# Patient Record
Sex: FEMALE | Race: BLACK OR AFRICAN AMERICAN | URBAN - METROPOLITAN AREA
[De-identification: names, ages, dates, MRNs, and addresses within clinical notes are randomized per-mention and may not be internally consistent; named-entity substitution may affect disease eponyms.]

---

## 2017-09-21 ENCOUNTER — OFFICE VISIT (OUTPATIENT)
Dept: FAMILY MEDICINE CLINIC | Age: 57
End: 2017-09-21

## 2017-09-21 VITALS
HEIGHT: 64 IN | BODY MASS INDEX: 30.9 KG/M2 | WEIGHT: 181 LBS | OXYGEN SATURATION: 99 % | HEART RATE: 68 BPM | TEMPERATURE: 98.2 F | SYSTOLIC BLOOD PRESSURE: 162 MMHG | DIASTOLIC BLOOD PRESSURE: 109 MMHG | RESPIRATION RATE: 16 BRPM

## 2017-09-21 DIAGNOSIS — J45.20 MILD INTERMITTENT ASTHMA WITHOUT COMPLICATION: ICD-10-CM

## 2017-09-21 DIAGNOSIS — E03.9 ACQUIRED HYPOTHYROIDISM: Primary | ICD-10-CM

## 2017-09-21 DIAGNOSIS — I10 ESSENTIAL HYPERTENSION: ICD-10-CM

## 2017-09-21 DIAGNOSIS — Z91.09 ENVIRONMENTAL ALLERGIES: ICD-10-CM

## 2017-09-21 RX ORDER — LEVOTHYROXINE SODIUM 137 UG/1
137 TABLET ORAL
Qty: 30 TAB | Refills: 5 | Status: SHIPPED | OUTPATIENT
Start: 2017-09-21 | End: 2018-10-16 | Stop reason: SDUPTHER

## 2017-09-21 RX ORDER — LISINOPRIL AND HYDROCHLOROTHIAZIDE 12.5; 2 MG/1; MG/1
1 TABLET ORAL DAILY
Qty: 30 TAB | Refills: 2 | Status: SHIPPED | OUTPATIENT
Start: 2017-09-21 | End: 2018-10-16 | Stop reason: SDUPTHER

## 2017-09-21 RX ORDER — ALBUTEROL SULFATE 90 UG/1
2 AEROSOL, METERED RESPIRATORY (INHALATION)
Qty: 1 INHALER | Refills: 0 | Status: SHIPPED | COMMUNITY
Start: 2017-09-21 | End: 2017-10-02 | Stop reason: SDUPTHER

## 2017-09-21 RX ORDER — LORATADINE 10 MG/1
10 TABLET ORAL DAILY
Qty: 30 TAB | Refills: 5 | Status: SHIPPED | OUTPATIENT
Start: 2017-09-21 | End: 2018-10-18 | Stop reason: SDUPTHER

## 2017-09-21 NOTE — PROGRESS NOTES
You may qualify for a Free Mammogram and/or Pap Smear from [facility]. Please call [phone] to start the screening process so you can be scheduled for these important services. The process cannot begin until you make the call to begin screening.

## 2017-09-21 NOTE — PROGRESS NOTES
Discharge instructions reviewed with patient    Medication list and understanding of medications reviewed with patient. OTC and herbal medications reviewed and added to med list if applicable  Barriers to adherence assessed. Guidance given regarding new medications this visit, including reason for taking this medicine, and common side effects. Thank you for returning your application for medication assistance. We will fax your application to the [facility] prescription , Juan C Amos. It may take anywhere from 3 to 6 weeks for your application to be approved. THE FIRST FILL OF YOUR MEDICINE WILL BE DELIVERED TO THE [facility] AUTOMATICALLY. CALL [phone] OR [phone]   TO ASK FOR REFILLS WHEN YOU HAVE ONE MONTH   OF MEDICINE LEFT. Think of it the same way as when you call your regular pharmacy to order your medicine refills. AVS given to pt.

## 2017-09-21 NOTE — PROGRESS NOTES
HPI  Dora Haines is a 62 y.o. female being seen today for   Chief Complaint   Patient presents with    Medication Refill   . IOV for this pt to care a Pearl becerra.  she states that she lost insurance needs refill on her bp, asthma medicines and thyroid medications. Out of inhalers and wheezing in her sleep. Past Medical History:   Diagnosis Date    Asthma     Hypertension     Thyroid disease          ROS  Patient states that she is feeling well. Denies complaints of chest pain, shortness of breath, swelling of legs, dizziness or weakness. she denies nausea, vomiting or diarrhea. Current Outpatient Prescriptions   Medication Sig    albuterol (PROVENTIL HFA, VENTOLIN HFA, PROAIR HFA) 90 mcg/actuation inhaler Take 2 Puffs by inhalation every four (4) hours as needed for Wheezing.  lisinopril-hydroCHLOROthiazide (PRINZIDE, ZESTORETIC) 20-12.5 mg per tablet Take 1 Tab by mouth daily.  loratadine (CLARITIN) 10 mg tablet Take 1 Tab by mouth daily.  levothyroxine (SYNTHROID) 137 mcg tablet Take 137 mcg by mouth Daily (before breakfast). No current facility-administered medications for this visit. PE  Visit Vitals    BP (!) 162/109 (BP 1 Location: Left arm, BP Patient Position: Sitting)    Pulse 68    Temp 98.2 °F (36.8 °C) (Oral)    Resp 16    Ht 5' 4\" (1.626 m)    Wt 181 lb (82.1 kg)    SpO2 99%    BMI 31.07 kg/m2        Alert and oriented with normal mood and affect. she is well developed and well nourished . Lungs are clear without wheezing. Heart rate is regular without murmurs or gallops. There is no lower extremity edema. No results found for this or any previous visit. Assessment and Plan:        ICD-10-CM ICD-9-CM    1. Acquired hypothyroidism E03.9 244.9    2. Essential hypertension I10 401.9    3. Mild intermittent asthma without complication H47.01 707.79    4.  Environmental allergies Z91.09 V15.09      Sample albuterol and initiate pap rx process for same  Refill all other meds as previous. Asked her to cut her thyroid med in half for first week, then increase to whole pill if she feels good.       Erick Travis MD

## 2017-09-21 NOTE — MR AVS SNAPSHOT
Visit Information Date & Time Provider Department Dept. Phone Encounter #  
 9/21/2017 10:30 AM Sigifredo Woo, [address] [id number] Upcoming Health Maintenance Date Due Hepatitis C Screening 1960 DTaP/Tdap/Td series (1 - Tdap) 3/17/1981 PAP AKA CERVICAL CYTOLOGY 3/17/1981 BREAST CANCER SCRN MAMMOGRAM 3/17/2010 FOBT Q 1 YEAR AGE 50-75 3/17/2010 INFLUENZA AGE 9 TO ADULT 8/1/2017 Allergies as of 9/21/2017  Review Complete On: 9/21/2017 By: Lorenzo Tapia No Known Allergies Current Immunizations  Never Reviewed No immunizations on file. Not reviewed this visit You Were Diagnosed With   
  
 Codes Comments Acquired hypothyroidism    -  Primary ICD-10-CM: E03.9 ICD-9-CM: 244.9 Essential hypertension     ICD-10-CM: I10 
ICD-9-CM: 401.9 Mild intermittent asthma without complication     HEENA-67-AR: J45.20 ICD-9-CM: 493.90 Environmental allergies     ICD-10-CM: Z91.09 
ICD-9-CM: V15.09 Vitals BP Pulse Temp Resp Height(growth percentile) Weight(growth percentile) (!) 162/109 (BP 1 Location: Left arm, BP Patient Position: Sitting) 68 98.2 °F (36.8 °C) (Oral) 16 5' 4\" (1.626 m) 181 lb (82.1 kg) SpO2 BMI OB Status Smoking Status 99% 31.07 kg/m2 Menopause Current Every Day Smoker Vitals History BMI and BSA Data Body Mass Index Body Surface Area 31.07 kg/m 2 1.93 m 2 Preferred Pharmacy Pharmacy Name Phone [address] [phone] Your Updated Medication List  
  
   
This list is accurate as of: 9/21/17 11:41 AM.  Always use your most recent med list.  
  
  
  
  
 albuterol 90 mcg/actuation inhaler Commonly known as:  PROVENTIL HFA, VENTOLIN HFA, PROAIR HFA Take 2 Puffs by inhalation every four (4) hours as needed for Wheezing. levothyroxine 137 mcg tablet Commonly known as:  SYNTHROID Take 137 mcg by mouth Daily (before breakfast). lisinopril-hydroCHLOROthiazide 20-12.5 mg per tablet Commonly known as:  Venu Freddy Take 1 Tab by mouth daily. loratadine 10 mg tablet Commonly known as:  [place] Pain Take 1 Tab by mouth daily. Prescriptions Sent to Pharmacy Refills  
 lisinopril-hydroCHLOROthiazide (PRINZIDE, ZESTORETIC) 20-12.5 mg per tablet 2 Sig: Take 1 Tab by mouth daily. Class: Normal  
 Pharmacy: [address] [place] [address] Ph #: [phone] Route: Oral  
 loratadine (CLARITIN) 10 mg tablet 5 Sig: Take 1 Tab by mouth daily. Class: Normal  
 Pharmacy: [address] [place] [address] Ph #: [phone] Route: Oral  
 levothyroxine (SYNTHROID) 137 mcg tablet 5 Sig: Take 137 mcg by mouth Daily (before breakfast). Class: Normal  
 Pharmacy: [address] [place] [address] Ph #: [phone] Route: Oral  
  
Introducing [facility] SERVICES! [employer] introduces [employer] patient portal. Now you can access parts of your medical record, email your doctor's office, and request medication refills online. 1. In your internet browser, go to [email or web address] 2. Click on the First Time User? Click Here link in the Sign In box. You will see the New Member Sign Up page. 3. Enter your [employer] Access Code exactly as it appears below. You will not need to use this code after youve completed the sign-up process. If you do not sign up before the expiration date, you must request a new code. · [employer] Access Code: [email or web address] Expires: 12/20/2017 11:09 AM 
 
4. Enter the last four digits of your Social Security Number (xxxx) and Date of Birth (mm/dd/yyyy) as indicated and click Submit. You will be taken to the next sign-up page. 5. Create a [employer] ID. This will be your [employer] login ID and cannot be changed, so think of one that is secure and easy to remember. 6. Create a [employer] password. You can change your password at any time. 7. Enter your Password Reset Question and Answer. This can be used at a later time if you forget your password. 8. Enter your e-mail address. You will receive e-mail notification when new information is available in [address] 9. Click Sign Up. You can now view and download portions of your medical record. 10. Click the Download Summary menu link to download a portable copy of your medical information. If you have questions, please visit the Frequently Asked Questions section of the [employer] website. Remember, [employer] is NOT to be used for urgent needs. For medical emergencies, dial 911. Now available from your iPhone and Android! Please provide this summary of care documentation to your next provider. If you have any questions after today's visit, please call [phone].

## 2017-09-25 ENCOUNTER — DOCUMENTATION ONLY (OUTPATIENT)
Dept: FAMILY MEDICINE CLINIC | Age: 57
End: 2017-09-25

## 2017-09-25 NOTE — PROGRESS NOTES
Received Pharmacy Assistance Program Application for Ventolin HFA completed from patient. Application faxed to Osvaldo Gardner for review. Fax confirmation received.

## 2017-10-02 RX ORDER — ALBUTEROL SULFATE 90 UG/1
2 AEROSOL, METERED RESPIRATORY (INHALATION)
Qty: 3 INHALER | Refills: 3 | Status: SHIPPED | COMMUNITY
Start: 2017-10-02

## 2017-10-02 NOTE — TELEPHONE ENCOUNTER
Received incoming fax from Selin Mcbride 182 list and chart notes reviewed. Pharmacy Assistance Medication approved for direct mail to anthony Douglas 90mcg/act   2 puff q 4 hour prn wheezing

## 2018-10-18 RX ORDER — LISINOPRIL AND HYDROCHLOROTHIAZIDE 12.5; 2 MG/1; MG/1
TABLET ORAL
Qty: 30 TAB | Refills: 1 | Status: SHIPPED | OUTPATIENT
Start: 2018-10-18

## 2018-10-18 RX ORDER — LEVOTHYROXINE SODIUM 137 UG/1
TABLET ORAL
Qty: 30 TAB | Refills: 1 | Status: SHIPPED | OUTPATIENT
Start: 2018-10-18 | End: 2019-04-25 | Stop reason: SDUPTHER

## 2018-10-18 RX ORDER — LORATADINE 10 MG/1
TABLET ORAL
Qty: 30 TAB | Refills: 1 | Status: SHIPPED | OUTPATIENT
Start: 2018-10-18

## 2019-04-25 RX ORDER — LEVOTHYROXINE SODIUM 137 UG/1
TABLET ORAL
Qty: 30 TAB | Refills: 0 | Status: SHIPPED | OUTPATIENT
Start: 2019-04-25

## 2019-05-24 RX ORDER — LEVOTHYROXINE SODIUM 137 UG/1
TABLET ORAL
Qty: 30 TAB | Refills: 0 | OUTPATIENT
Start: 2019-05-24

## 2019-06-29 RX ORDER — LEVOTHYROXINE SODIUM 137 UG/1
TABLET ORAL
Qty: 30 TAB | Refills: 0 | OUTPATIENT
Start: 2019-06-29

## 2019-10-10 ENCOUNTER — OFFICE VISIT (OUTPATIENT)
Dept: ORTHOPEDIC SURGERY | Facility: CLINIC | Age: 59
End: 2019-10-10

## 2019-10-10 VITALS
WEIGHT: 236.6 LBS | HEART RATE: 72 BPM | SYSTOLIC BLOOD PRESSURE: 151 MMHG | BODY MASS INDEX: 40.39 KG/M2 | HEIGHT: 64 IN | TEMPERATURE: 98.8 F | OXYGEN SATURATION: 100 % | DIASTOLIC BLOOD PRESSURE: 70 MMHG | RESPIRATION RATE: 18 BRPM

## 2019-10-10 DIAGNOSIS — M22.41 CHONDROMALACIA OF BOTH PATELLAE: ICD-10-CM

## 2019-10-10 DIAGNOSIS — G89.29 CHRONIC PAIN OF LEFT KNEE: ICD-10-CM

## 2019-10-10 DIAGNOSIS — M25.562 CHRONIC PAIN OF LEFT KNEE: ICD-10-CM

## 2019-10-10 DIAGNOSIS — M17.0 LOCALIZED OSTEOARTHRITIS OF BOTH KNEES: Primary | ICD-10-CM

## 2019-10-10 DIAGNOSIS — M25.561 CHRONIC PAIN OF RIGHT KNEE: ICD-10-CM

## 2019-10-10 DIAGNOSIS — M22.42 CHONDROMALACIA OF BOTH PATELLAE: ICD-10-CM

## 2019-10-10 DIAGNOSIS — G89.29 CHRONIC PAIN OF RIGHT KNEE: ICD-10-CM

## 2019-10-10 DIAGNOSIS — E66.01 OBESITY, MORBID (HCC): ICD-10-CM

## 2019-10-10 RX ORDER — CLONIDINE 0.2MG/24HR
PATCH, TRANSDERMAL WEEKLY TRANSDERMAL
COMMUNITY
Start: 2019-09-16

## 2019-10-10 RX ORDER — GABAPENTIN 400 MG/1
CAPSULE ORAL
COMMUNITY
Start: 2019-09-27

## 2019-10-10 RX ORDER — METFORMIN HYDROCHLORIDE 850 MG/1
TABLET ORAL
COMMUNITY
Start: 2019-09-16

## 2019-10-10 RX ORDER — GUAIFENESIN 100 MG/5ML
LIQUID (ML) ORAL
COMMUNITY
Start: 2019-09-16

## 2019-10-10 RX ORDER — ATORVASTATIN CALCIUM 20 MG/1
TABLET, FILM COATED ORAL
COMMUNITY
Start: 2019-09-16

## 2019-10-10 RX ORDER — SERTRALINE HYDROCHLORIDE 25 MG/1
TABLET, FILM COATED ORAL
COMMUNITY
Start: 2019-09-16

## 2019-10-10 RX ORDER — OLANZAPINE 5 MG/1
TABLET, ORALLY DISINTEGRATING ORAL
COMMUNITY
Start: 2019-09-16

## 2019-10-10 RX ORDER — BETAMETHASONE SODIUM PHOSPHATE AND BETAMETHASONE ACETATE 3; 3 MG/ML; MG/ML
6 INJECTION, SUSPENSION INTRA-ARTICULAR; INTRALESIONAL; INTRAMUSCULAR; SOFT TISSUE ONCE
Qty: 0.5 ML | Refills: 0
Start: 2019-10-10 | End: 2019-10-10

## 2019-10-10 NOTE — LETTER
10/10/2019 10:29 AM 
 
Ms. Argelia Pérez [address] [phone] Full work restrictions for injuries to the Thigh, Knee, Leg, Ankle, and Foot PATIENT'S NAME: Cathy Kiser   DATE: 10/10/2019 LIFTING:   The patient can lift no more than 20 pounds. CLIMBING:   The patient can climb no ladders or stairs WALKING/STANDING The patient can walk or stand no more than 1 hour at a time. The patient cannot walk on uneven ground or on scaffolding. KNEELING/SQUATTING The patient cannot kneel or squat These restrictions are in effect for the above named patient From: 10/10/2019  TO: PERMANENT Sincerely, 
 
 
 
Sandra Garcia MD

## 2019-10-10 NOTE — PROGRESS NOTES
Verbal order given by Dr. Aly Albright to draw up 4 mL 0.25% Sensorcaine and 0.5 mL 30 mg/5mL Betamethasone X 2.

## 2019-10-10 NOTE — PROGRESS NOTES
Patient: Everardo Kenny                MRN: [id number]       SSN: [id number]  YOB: 1960        AGE: 61 y.o. SEX: female    PCP: No primary care provider on file. 10/10/19    Chief Complaint   Patient presents with    Knee Pain     Abelino     HISTORY:  Everardo Kenny is a 61 y.o. female who is seen for bilateral knee pain. She has been experiencing knee pain for the past several years. She does not recall any injury. She notes pain with standing, walking, and stair climbing. She experiences startup pain after sitting. She is s/p left medial menisectomy by Dr. Naz Nelson in 2006. She states there was no injury to her left knee prior to her knee surgery. She stopped seeing Dr. Naz Nelson for insurance reasons. She now presents for a second orthopedic opinion and further care. Pain Assessment  10/10/2019   Location of Pain Knee   Location Modifiers Left;Right   Severity of Pain 5   Quality of Pain Aching; Sharp   Duration of Pain Persistent   Frequency of Pain Constant   Aggravating Factors Walking;Standing;Bending   Limiting Behavior Yes   Relieving Factors Heat   Result of Injury No     Occupation, etc:  Ms. Martín Zamora is not currently employed. She is applying for social security disability benefits for knee pain. She previously worked as a  at the [employer] and as a  at [employer]. She lives in [place] with her 24 yo daughter. Her daughter helps her with activities around the house. She also has a 41 yo son, a 10 yo grandson and 7 yo grandson. She is present with her counselor today. She enjoys chair dancing. Ms. Martín Zamora weighs 181 lbs and is 5'4\" tall.        No results found for: HBA1C, HGBE8, UUR0ICYQ, WWL3WLKH, QDK6DBBU  Weight Metrics 10/10/2019 9/21/2017   Weight 236 lb 9.6 oz 181 lb   BMI 40.61 kg/m2 31.07 kg/m2       Patient Active Problem List   Diagnosis Code    Essential hypertension I10    Acquired hypothyroidism E03.9    Mild intermittent asthma without complication K32.96    Environmental allergies Z91.09     REVIEW OF SYSTEMS: All Below are Negative except: See HPI   Constitutional: negative for fever, chills, and weight loss. Cardiovascular: negative for chest pain, claudication, leg swelling, SOB, ZULUAGA   Gastrointestinal: Negative for pain, N/V/C/D, Blood in stool or urine, dysuria, hematuria, incontinence, pelvic pain. Musculoskeletal: See HPI   Neurological: Negative for dizziness and weakness. Negative for headaches, Visual changes, confusion, seizures   Phychiatric/Behavioral: Negative for depression, memory loss, substance abuse. Extremities: Negative for hair changes, rash, or skin lesion changes. Hematologic: Negative for bleeding problems, bruising, pallor or swollen lymph nodes   Peripheral Vascular: No calf pain, no circulation deficits.     Social History     Socioeconomic History    Marital status: SINGLE     Spouse name: Not on file    Number of children: Not on file    Years of education: Not on file    Highest education level: Not on file   Occupational History    Not on file   Social Needs    Financial resource strain: Not on file    Food insecurity:     Worry: Not on file     Inability: Not on file    Transportation needs:     Medical: Not on file     Non-medical: Not on file   Tobacco Use    Smoking status: Current Every Day Smoker     Packs/day: 0.50     Types: Cigarettes    Smokeless tobacco: Never Used   Substance and Sexual Activity    Alcohol use: Yes     Comment: occ    Drug use: Not on file    Sexual activity: Not on file   Lifestyle    Physical activity:     Days per week: Not on file     Minutes per session: Not on file    Stress: Not on file   Relationships    Social connections:     Talks on phone: Not on file     Gets together: Not on file     Attends Restorationism service: Not on file     Active member of club or organization: Not on file     Attends meetings of clubs or organizations: Not on file     Relationship status: Not on file    Intimate partner violence:     Fear of current or ex partner: Not on file     Emotionally abused: Not on file     Physically abused: Not on file     Forced sexual activity: Not on file   Other Topics Concern    Not on file   Social History Narrative    Not on file      No Known Allergies   Current Outpatient Medications   Medication Sig    aspirin 81 mg chewable tablet     atorvastatin (LIPITOR) 20 mg tablet     CATAPRES-TTS-2 0.2 mg/24 hr ptwk     gabapentin (NEURONTIN) 400 mg capsule     metFORMIN (GLUCOPHAGE) 850 mg tablet     sertraline (ZOLOFT) 25 mg tablet     OLANZapine (ZYPREXA ZYDIS) 5 mg disintegrating tablet     levothyroxine (SYNTHROID) 137 mcg tablet TAKE ONE TABLET EVERY DAY BEFORE BREAKFAST    ALLERGY RELIEF, LORATADINE, 10 mg tablet TAKE ONE TABLET EVERY DAY    lisinopril-hydroCHLOROthiazide (PRINZIDE, ZESTORETIC) 20-12.5 mg per tablet TAKE ONE TABLET EVERY DAY    albuterol (PROVENTIL HFA, VENTOLIN HFA, PROAIR HFA) 90 mcg/actuation inhaler Take 2 Puffs by inhalation every four (4) hours as needed for Wheezing. No current facility-administered medications for this visit.        PHYSICAL EXAMINATION:  Visit Vitals  /70   Pulse 72   Temp 98.8 °F (37.1 °C) (Oral)   Resp 18   Ht 5' 4\" (1.626 m)   Wt 236 lb 9.6 oz (107.3 kg)   SpO2 100%   BMI 40.61 kg/m²      ORTHO EXAMINATION:  Examination Right knee Left knee   Skin Intact Intact   Range of motion 100-0 100-0   Effusion - -   Medial joint line tenderness + +   Lateral joint line tenderness - -   Popliteal tenderness - -   Osteophytes palpable + +   Reinaldos - -   Patella crepitus + +   Anterior drawer - -   Lateral laxity - -   Medial laxity - -   Varus deformity - -   Valgus deformity - -   Pretibial edema - -   Calf tenderness - -         TIME OUT:  Chart reviewed for the following:   Sandra ROD MD, have reviewed the History, Physical and updated the Allergic reactions for [address] performed immediately prior to start of procedure:  Terrell Lainez MD, have performed the following reviews on [address] prior to the start of the procedure:          * Patient was identified by name and date of birth   * Agreement on procedure being performed was verified  * Risks and Benefits explained to the patient  * Procedure site verified and marked as necessary  * Patient was positioned for comfort  * Consent was obtained     Time: 10:30 AM     Date of procedure: 10/10/2019  Procedure performed by:  Karina Cabello MD  Ms. Kiser tolerated the procedure well with no complications. RADIOGRAPHS:  XR BILAT KNEE 10/10/19 ARTI  IMPRESSION:  Three views - No fractures, no effusion, moderate medial joint space narrowing, no osteophytes present. Kellgren Pierce grade 2, severe patellofemoral narrowing    IMPRESSION:      ICD-10-CM ICD-9-CM    1. Localized osteoarthritis of both knees M17.0 715.36 betamethasone (CELESTONE SOLUSPAN) 6 mg/mL injection      BETAMETHASONE ACETATE & SODIUM PHOSPHATE INJECTION 3 MG EA.      DRAIN/INJECT LARGE JOINT/BURSA      REFERRAL TO PHYSICAL THERAPY      PROCEDURE AUTHORIZATION TO    2. Chronic pain of right knee M25.561 719.46 AMB POC X-RAY KNEE 3 VIEW    G89.29 338.29 betamethasone (CELESTONE SOLUSPAN) 6 mg/mL injection      BETAMETHASONE ACETATE & SODIUM PHOSPHATE INJECTION 3 MG EA.      DRAIN/INJECT LARGE JOINT/BURSA      REFERRAL TO PHYSICAL THERAPY      PROCEDURE AUTHORIZATION TO    3. Chronic pain of left knee M25.562 719.46 AMB POC X-RAY KNEE 3 VIEW    G89.29 338.29 betamethasone (CELESTONE SOLUSPAN) 6 mg/mL injection      BETAMETHASONE ACETATE & SODIUM PHOSPHATE INJECTION 3 MG EA.      DRAIN/INJECT LARGE JOINT/BURSA      REFERRAL TO PHYSICAL THERAPY      PROCEDURE AUTHORIZATION TO    4.  Chondromalacia of both patellae M22.41 717.7     M22.42       PLAN:  After discussing treatment options, patient's knees were injected with 4 cc Marcaine and 1/2 cc Celestone. Consider visco supplementation if pain continues. She will start a brief course of outpatient physical therapy. Begin permanent full bilateral knee restrictions. There is no need for surgery at this time. She will follow up as needed.       Scribed by Rosa Lorenzo ([address] [place] [address] [address]) as dictated by Cristian Cross MD

## 2019-12-19 ENCOUNTER — OFFICE VISIT (OUTPATIENT)
Dept: ORTHOPEDIC SURGERY | Facility: CLINIC | Age: 59
End: 2019-12-19

## 2019-12-19 VITALS
BODY MASS INDEX: 40.12 KG/M2 | DIASTOLIC BLOOD PRESSURE: 85 MMHG | HEIGHT: 64 IN | WEIGHT: 235 LBS | TEMPERATURE: 97.9 F | RESPIRATION RATE: 16 BRPM | SYSTOLIC BLOOD PRESSURE: 154 MMHG | HEART RATE: 73 BPM | OXYGEN SATURATION: 99 %

## 2019-12-19 DIAGNOSIS — G89.29 CHRONIC PAIN OF LEFT KNEE: ICD-10-CM

## 2019-12-19 DIAGNOSIS — M17.12 PRIMARY OSTEOARTHRITIS OF LEFT KNEE: Primary | ICD-10-CM

## 2019-12-19 DIAGNOSIS — M17.11 PRIMARY OSTEOARTHRITIS OF RIGHT KNEE: ICD-10-CM

## 2019-12-19 DIAGNOSIS — M25.561 CHRONIC PAIN OF RIGHT KNEE: ICD-10-CM

## 2019-12-19 DIAGNOSIS — G89.29 CHRONIC PAIN OF RIGHT KNEE: ICD-10-CM

## 2019-12-19 DIAGNOSIS — M25.562 CHRONIC PAIN OF LEFT KNEE: ICD-10-CM

## 2019-12-19 RX ORDER — ACETAMINOPHEN 500 MG
TABLET ORAL
COMMUNITY

## 2019-12-19 RX ORDER — BETAMETHASONE SODIUM PHOSPHATE AND BETAMETHASONE ACETATE 3; 3 MG/ML; MG/ML
6 INJECTION, SUSPENSION INTRA-ARTICULAR; INTRALESIONAL; INTRAMUSCULAR; SOFT TISSUE ONCE
Qty: 0.5 ML | Refills: 0
Start: 2019-12-19 | End: 2019-12-19

## 2019-12-19 NOTE — PROGRESS NOTES
1. Have you been to the ER, urgent care clinic since your last visit? Hospitalized since your last visit? NO    2. Have you seen or consulted any other health care providers outside of the [address] [place] [address] since your last visit? Include any pap smears or colon screening.    NO

## 2019-12-19 NOTE — PROGRESS NOTES
Patient: Paulie Ritter                MRN: [id number]       SSN: [id number]  YOB: 1960        AGE: 61 y.o. SEX: female    PCP: No primary care provider on file. 12/19/19    Chief Complaint   Patient presents with    Knee Pain     rafael knee pain, f/u appt. HISTORY:  Paulie Ritter is a 61 y.o. female who is seen for increased bilateral knee pain. She has been experiencing knee pain for the past several years. She does not recall any injury. She notes pain with standing, walking, and stair climbing. She experiences startup pain after sitting. She is s/p left medial menisectomy by Dr. Sandhya Maguire in 2006. She states there was no injury to her left knee prior to her knee surgery. She stopped seeing Dr. Sandhya Maguire for insurance reasons. She now presents for a second orthopedic opinion and further care. She has tried a variety of conservative measures including cortisone injections, NSAIDs, and physical therapy. Pain Assessment  12/19/2019   Location of Pain Knee   Location Modifiers Left;Right   Severity of Pain 4   Quality of Pain Aching; Sharp;Popping;Cracking;Grinding;Locking   Duration of Pain Persistent   Frequency of Pain Constant   Aggravating Factors Bending;Standing;Walking   Limiting Behavior Yes   Relieving Factors Heat   Result of Injury No     Occupation, etc:  Ms. Rea Dela Cruz is not currently employed. She is applying for social security disability benefits for knee pain. She has been denied once. She previously worked as a  at the [employer] and as a  at [employer]. She lives in [place] with her 24 yo daughter. Her daughter helps her with activities around the house. She also has a 39 yo son, a 10 yo grandson and 7 yo grandson. She is present with her counselor Tanvi Bennett today. She is a metformin controlled diabetic. She enjoys chair dancing. Ms. Rea Dela Cruz weighs 235 lbs and is 5'4\" tall.        No results found for: HBA1C, HGBE8, VXL8GHJS, ROQ4CPJQ, SEP7LUIW  Weight Metrics 12/19/2019 10/10/2019 9/21/2017   Weight 235 lb 236 lb 9.6 oz 181 lb   BMI 40.34 kg/m2 40.61 kg/m2 31.07 kg/m2       Patient Active Problem List   Diagnosis Code    Essential hypertension I10    Acquired hypothyroidism E03.9    Mild intermittent asthma without complication P90.45    Environmental allergies Z91.09    Obesity, morbid ([facility] [address] E66.01     REVIEW OF SYSTEMS: All Below are Negative except: See HPI   Constitutional: negative for fever, chills, and weight loss. Cardiovascular: negative for chest pain, claudication, leg swelling, SOB, ZULUAGA   Gastrointestinal: Negative for pain, N/V/C/D, Blood in stool or urine, dysuria, hematuria, incontinence, pelvic pain. Musculoskeletal: See HPI   Neurological: Negative for dizziness and weakness. Negative for headaches, Visual changes, confusion, seizures   Phychiatric/Behavioral: Negative for depression, memory loss, substance abuse. Extremities: Negative for hair changes, rash, or skin lesion changes. Hematologic: Negative for bleeding problems, bruising, pallor or swollen lymph nodes   Peripheral Vascular: No calf pain, no circulation deficits.     Social History     Socioeconomic History    Marital status: SINGLE     Spouse name: Not on file    Number of children: Not on file    Years of education: Not on file    Highest education level: Not on file   Occupational History    Not on file   Social Needs    Financial resource strain: Not on file    Food insecurity:     Worry: Not on file     Inability: Not on file    Transportation needs:     Medical: Not on file     Non-medical: Not on file   Tobacco Use    Smoking status: Current Every Day Smoker     Packs/day: 0.50     Types: Cigarettes    Smokeless tobacco: Never Used   Substance and Sexual Activity    Alcohol use: Yes     Comment: occ    Drug use: Not on file    Sexual activity: Not on file   Lifestyle    Physical activity:     Days per week: Not on file     Minutes per session: Not on file    Stress: Not on file   Relationships    Social connections:     Talks on phone: Not on file     Gets together: Not on file     Attends Sabianism service: Not on file     Active member of club or organization: Not on file     Attends meetings of clubs or organizations: Not on file     Relationship status: Not on file    Intimate partner violence:     Fear of current or ex partner: Not on file     Emotionally abused: Not on file     Physically abused: Not on file     Forced sexual activity: Not on file   Other Topics Concern    Not on file   Social History Narrative    Not on file      No Known Allergies   Current Outpatient Medications   Medication Sig    acetaminophen (TYLENOL EXTRA STRENGTH) 500 mg tablet Take  by mouth every six (6) hours as needed for Pain.  aspirin 81 mg chewable tablet     atorvastatin (LIPITOR) 20 mg tablet     CATAPRES-TTS-2 0.2 mg/24 hr ptwk     gabapentin (NEURONTIN) 400 mg capsule     metFORMIN (GLUCOPHAGE) 850 mg tablet     sertraline (ZOLOFT) 25 mg tablet     OLANZapine (ZYPREXA ZYDIS) 5 mg disintegrating tablet     levothyroxine (SYNTHROID) 137 mcg tablet TAKE ONE TABLET EVERY DAY BEFORE BREAKFAST    ALLERGY RELIEF, LORATADINE, 10 mg tablet TAKE ONE TABLET EVERY DAY    lisinopril-hydroCHLOROthiazide (PRINZIDE, ZESTORETIC) 20-12.5 mg per tablet TAKE ONE TABLET EVERY DAY    albuterol (PROVENTIL HFA, VENTOLIN HFA, PROAIR HFA) 90 mcg/actuation inhaler Take 2 Puffs by inhalation every four (4) hours as needed for Wheezing. No current facility-administered medications for this visit.        PHYSICAL EXAMINATION:  Visit Vitals  /85 (BP 1 Location: Left arm, BP Patient Position: Sitting)   Pulse 73   Temp 97.9 °F (36.6 °C) (Oral)   Resp 16   Ht 5' 4\" (1.626 m)   Wt 235 lb (106.6 kg)   SpO2 99%   BMI 40.34 kg/m²      ORTHO EXAMINATION:  Examination Right knee Left knee   Skin Intact Intact   Range of motion 100-0 100-0   Effusion - - Medial joint line tenderness + +   Lateral joint line tenderness - -   Popliteal tenderness - -   Osteophytes palpable + +   Reinaldos - -   Patella crepitus + +   Anterior drawer - -   Lateral laxity - -   Medial laxity - -   Varus deformity - -   Valgus deformity - -   Pretibial edema - -   Calf tenderness - -         TIME OUT:  Chart reviewed for the following:   Sid Morris MD, have reviewed the History, Physical and updated the Allergic reactions for [address] performed immediately prior to start of procedure:  Sid Morris MD, have performed the following reviews on [address] prior to the start of the procedure:          * Patient was identified by name and date of birth   * Agreement on procedure being performed was verified  * Risks and Benefits explained to the patient  * Procedure site verified and marked as necessary  * Patient was positioned for comfort  * Consent was obtained     Time: 10:48 AM    Date of procedure: 12/19/2019  Procedure performed by:  Iraj Ocasio MD  Ms. Kiser tolerated the procedure well with no complications. RADIOGRAPHS:  XR BILAT KNEE 10/10/19 ARTI  IMPRESSION:  Three views - No fractures, no effusion, moderate medial joint space narrowing, no osteophytes present. Kellgren Pierce grade 2, severe patellofemoral narrowing    IMPRESSION:      ICD-10-CM ICD-9-CM    1. Primary osteoarthritis of left knee M17.12 715.16 betamethasone (CELESTONE SOLUSPAN) 6 mg/mL injection      BETAMETHASONE ACETATE & SODIUM PHOSPHATE INJECTION 3 MG EA.      DRAIN/INJECT LARGE JOINT/BURSA      PROCEDURE AUTHORIZATION TO       REFERRAL TO PHYSICAL THERAPY   2. Chronic pain of left knee M25.562 719.46 betamethasone (CELESTONE SOLUSPAN) 6 mg/mL injection    G89.29 338.29 BETAMETHASONE ACETATE & SODIUM PHOSPHATE INJECTION 3 MG EA.      DRAIN/INJECT LARGE JOINT/BURSA      PROCEDURE AUTHORIZATION TO       REFERRAL TO PHYSICAL THERAPY   3. Primary osteoarthritis of right knee M17.11 715.16 betamethasone (CELESTONE SOLUSPAN) 6 mg/mL injection      BETAMETHASONE ACETATE & SODIUM PHOSPHATE INJECTION 3 MG EA.      DRAIN/INJECT LARGE JOINT/BURSA      PROCEDURE AUTHORIZATION TO       REFERRAL TO PHYSICAL THERAPY   4. Chronic pain of right knee M25.561 719.46 betamethasone (CELESTONE SOLUSPAN) 6 mg/mL injection    G89.29 338.29 BETAMETHASONE ACETATE & SODIUM PHOSPHATE INJECTION 3 MG EA.      DRAIN/INJECT LARGE JOINT/BURSA      PROCEDURE AUTHORIZATION TO       REFERRAL TO PHYSICAL THERAPY     PLAN:  After discussing treatment options, patient's knees were reinjected with 4 cc Marcaine and 1/2 cc Celestone. She has tried a variety of conservative measures including NSAIDs, injections, and activity restrictions all with incomplete temporary relief. Consider visco supplementation if pain continues. She will start a brief course of outpatient physical therapy. Continue permanent full bilateral knee restrictions. There is no need for surgery at this time. Dietary counseling provided today. Start weight loss with low carb diet and intermittent fasting. She will follow up as needed.       Scribed by Jayna Dover (Landy Adan) as dictated by Alexander Montelongo MD

## 2019-12-19 NOTE — PROGRESS NOTES
Verbal order given by Dr. Monty Persaud to draw up 4mL 0.25% Sensorcaine and 0.5 mL 30 mg/5mL Betamethasone x 2.  Rosalio Prater LPN

## 2020-03-09 ENCOUNTER — OFFICE VISIT (OUTPATIENT)
Dept: ORTHOPEDIC SURGERY | Facility: CLINIC | Age: 60
End: 2020-03-09

## 2020-03-09 VITALS
SYSTOLIC BLOOD PRESSURE: 132 MMHG | DIASTOLIC BLOOD PRESSURE: 68 MMHG | RESPIRATION RATE: 18 BRPM | HEART RATE: 68 BPM | BODY MASS INDEX: 37.42 KG/M2 | WEIGHT: 219.2 LBS | HEIGHT: 64 IN | TEMPERATURE: 97.9 F | OXYGEN SATURATION: 100 %

## 2020-03-09 DIAGNOSIS — S43.102S SEPARATION OF LEFT ACROMIOCLAVICULAR JOINT, TYPE 3, SEQUELA: Primary | ICD-10-CM

## 2020-03-09 DIAGNOSIS — M25.512 CHRONIC LEFT SHOULDER PAIN: ICD-10-CM

## 2020-03-09 DIAGNOSIS — M25.562 CHRONIC PAIN OF LEFT KNEE: ICD-10-CM

## 2020-03-09 DIAGNOSIS — M25.561 CHRONIC PAIN OF RIGHT KNEE: ICD-10-CM

## 2020-03-09 DIAGNOSIS — M17.11 PRIMARY OSTEOARTHRITIS OF RIGHT KNEE: ICD-10-CM

## 2020-03-09 DIAGNOSIS — G89.29 CHRONIC LEFT SHOULDER PAIN: ICD-10-CM

## 2020-03-09 DIAGNOSIS — M75.52 CHRONIC BURSITIS OF LEFT SHOULDER: ICD-10-CM

## 2020-03-09 DIAGNOSIS — M17.12 PRIMARY OSTEOARTHRITIS OF LEFT KNEE: ICD-10-CM

## 2020-03-09 DIAGNOSIS — G89.29 CHRONIC PAIN OF LEFT KNEE: ICD-10-CM

## 2020-03-09 DIAGNOSIS — G89.29 CHRONIC PAIN OF RIGHT KNEE: ICD-10-CM

## 2020-03-09 RX ORDER — HYALURONATE SODIUM 10 MG/ML
2 SYRINGE (ML) INTRAARTICULAR ONCE
Qty: 2 ML | Refills: 0
Start: 2020-03-09 | End: 2020-03-09

## 2020-03-09 RX ORDER — BETAMETHASONE SODIUM PHOSPHATE AND BETAMETHASONE ACETATE 3; 3 MG/ML; MG/ML
6 INJECTION, SUSPENSION INTRA-ARTICULAR; INTRALESIONAL; INTRAMUSCULAR; SOFT TISSUE ONCE
Qty: 0.5 ML | Refills: 0
Start: 2020-03-09 | End: 2020-03-09
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
## 2020-03-16 ENCOUNTER — OFFICE VISIT (OUTPATIENT)
Dept: ORTHOPEDIC SURGERY | Facility: CLINIC | Age: 60
End: 2020-03-16

## 2020-03-16 VITALS
HEIGHT: 64 IN | BODY MASS INDEX: 37.11 KG/M2 | OXYGEN SATURATION: 100 % | DIASTOLIC BLOOD PRESSURE: 66 MMHG | RESPIRATION RATE: 16 BRPM | TEMPERATURE: 97.4 F | HEART RATE: 69 BPM | WEIGHT: 217.4 LBS | SYSTOLIC BLOOD PRESSURE: 129 MMHG

## 2020-03-16 DIAGNOSIS — M25.562 CHRONIC PAIN OF LEFT KNEE: ICD-10-CM

## 2020-03-16 DIAGNOSIS — M17.11 PRIMARY OSTEOARTHRITIS OF RIGHT KNEE: ICD-10-CM

## 2020-03-16 DIAGNOSIS — G89.29 CHRONIC PAIN OF LEFT KNEE: ICD-10-CM

## 2020-03-16 DIAGNOSIS — M17.12 PRIMARY OSTEOARTHRITIS OF LEFT KNEE: Primary | ICD-10-CM

## 2020-03-16 RX ORDER — HYALURONATE SODIUM 10 MG/ML
2 SYRINGE (ML) INTRAARTICULAR ONCE
Qty: 2 ML | Refills: 0
Start: 2020-03-16 | End: 2020-03-16

## 2020-03-16 NOTE — PROGRESS NOTES
1. Have you been to the ER, urgent care clinic since your last visit? Hospitalized since your last visit? No    2. Have you seen or consulted any other health care providers outside of the [address] [place] [address] since your last visit? Include any pap smears or colon screening.  No
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
## 2020-03-26 ENCOUNTER — OFFICE VISIT (OUTPATIENT)
Dept: ORTHOPEDIC SURGERY | Facility: CLINIC | Age: 60
End: 2020-03-26

## 2020-03-26 VITALS
TEMPERATURE: 98.7 F | RESPIRATION RATE: 20 BRPM | DIASTOLIC BLOOD PRESSURE: 78 MMHG | OXYGEN SATURATION: 100 % | BODY MASS INDEX: 38.93 KG/M2 | SYSTOLIC BLOOD PRESSURE: 157 MMHG | HEART RATE: 72 BPM | HEIGHT: 64 IN | WEIGHT: 228 LBS

## 2020-03-26 DIAGNOSIS — G89.29 CHRONIC PAIN OF RIGHT KNEE: ICD-10-CM

## 2020-03-26 DIAGNOSIS — M25.561 CHRONIC PAIN OF RIGHT KNEE: ICD-10-CM

## 2020-03-26 DIAGNOSIS — M25.562 CHRONIC PAIN OF LEFT KNEE: ICD-10-CM

## 2020-03-26 DIAGNOSIS — G89.29 CHRONIC PAIN OF LEFT KNEE: ICD-10-CM

## 2020-03-26 DIAGNOSIS — M17.11 PRIMARY OSTEOARTHRITIS OF RIGHT KNEE: ICD-10-CM

## 2020-03-26 DIAGNOSIS — M17.12 PRIMARY OSTEOARTHRITIS OF LEFT KNEE: Primary | ICD-10-CM

## 2020-03-26 RX ORDER — HYALURONATE SODIUM 10 MG/ML
2 SYRINGE (ML) INTRAARTICULAR ONCE
Qty: 2 ML | Refills: 0
Start: 2020-03-26 | End: 2020-03-26

## 2020-03-26 NOTE — PROGRESS NOTES
Patient: Prieto Shelby                MRN: [id number]       SSN: [id number]  YOB: 1960        AGE: 61 y.o. SEX: female  Body mass index is 39.14 kg/m². PCP: No primary care provider on file.  03/26/20    Chief Complaint   Patient presents with    Knee Pain     Abelino     HISTORY:  Prieto Shelby is a 61 y.o. female who is seen for bilateral knee pain. TIME OUT performed immediately prior to start of procedure:  Lila Colby MD, have performed the following reviews on Prieto Shelby prior to the start of the procedure:            * Patient was identified by name and date of birth   * Agreement on procedure being performed was verified  * Risks and Benefits explained to the patient  * Procedure site verified and marked as necessary  * Patient was positioned for comfort  * Consent was obtained     Time: 11:11 AM    Date of procedure: 3/26/2020    Procedure performed by:  Korin Thakur MD    Ms. Kiser tolerated the procedure well with no complications      LDO-20-RO ICD-9-CM    1. Primary osteoarthritis of left knee M17.12 715.16 NM DRAIN/INJECT LARGE JOINT/BURSA      EUFLEXXA INJECTION PER DOSE      sodium hyaluronate (SUPARTZ FX/HYALGAN/GENIVSC) 10 mg/mL syrg injection   2. Chronic pain of left knee M25.562 719.46 NM DRAIN/INJECT LARGE JOINT/BURSA    G89.29 338.29 EUFLEXXA INJECTION PER DOSE      sodium hyaluronate (SUPARTZ FX/HYALGAN/GENIVSC) 10 mg/mL syrg injection   3. Primary osteoarthritis of right knee M17.11 715.16 NM DRAIN/INJECT LARGE JOINT/BURSA      EUFLEXXA INJECTION PER DOSE      sodium hyaluronate (SUPARTZ FX/HYALGAN/GENIVSC) 10 mg/mL syrg injection   4. Chronic pain of right knee M25.561 719.46 NM DRAIN/INJECT LARGE JOINT/BURSA    G89.29 338.29 EUFLEXXA INJECTION PER DOSE      sodium hyaluronate (SUPARTZ FX/HYALGAN/GENIVSC) 10 mg/mL syrg injection     PLAN:  After discussing treatment options, patient's knees were injected with 2 cc of Euflexxa.   Ms. Elizabeth romano follow up PRN now that she has completed her visco supplementation injection series.       Scribed by Bettina Hammans ([address] [place] [address] [address]) as dictated by Keyla Conte MD

## 2020-08-31 ENCOUNTER — OFFICE VISIT (OUTPATIENT)
Dept: ORTHOPEDIC SURGERY | Facility: CLINIC | Age: 60
End: 2020-08-31

## 2020-08-31 VITALS
HEART RATE: 78 BPM | DIASTOLIC BLOOD PRESSURE: 88 MMHG | OXYGEN SATURATION: 96 % | WEIGHT: 233 LBS | TEMPERATURE: 97.3 F | HEIGHT: 64 IN | RESPIRATION RATE: 20 BRPM | SYSTOLIC BLOOD PRESSURE: 180 MMHG | BODY MASS INDEX: 39.78 KG/M2

## 2020-08-31 DIAGNOSIS — M17.11 PRIMARY OSTEOARTHRITIS OF RIGHT KNEE: Primary | ICD-10-CM

## 2020-08-31 DIAGNOSIS — M25.561 CHRONIC PAIN OF RIGHT KNEE: ICD-10-CM

## 2020-08-31 DIAGNOSIS — G89.29 CHRONIC PAIN OF RIGHT KNEE: ICD-10-CM

## 2020-08-31 DIAGNOSIS — G89.29 CHRONIC PAIN OF LEFT KNEE: ICD-10-CM

## 2020-08-31 DIAGNOSIS — M17.12 PRIMARY OSTEOARTHRITIS OF LEFT KNEE: ICD-10-CM

## 2020-08-31 DIAGNOSIS — M25.562 CHRONIC PAIN OF LEFT KNEE: ICD-10-CM

## 2020-08-31 RX ORDER — BETAMETHASONE SODIUM PHOSPHATE AND BETAMETHASONE ACETATE 3; 3 MG/ML; MG/ML
6 INJECTION, SUSPENSION INTRA-ARTICULAR; INTRALESIONAL; INTRAMUSCULAR; SOFT TISSUE ONCE
Qty: 0.5 ML | Refills: 0
Start: 2020-08-31 | End: 2020-08-31

## 2020-08-31 RX ORDER — DICLOFENAC SODIUM 10 MG/G
4 GEL TOPICAL 4 TIMES DAILY
Qty: 5 EACH | Refills: 5 | Status: SHIPPED | OUTPATIENT
Start: 2020-08-31

## 2020-08-31 NOTE — LETTER
NOTIFICATION RETURN TO WORK / SCHOOL 
 
8/31/2020 11:56 AM 
 
Ms. Alfonso Burch [address] [id number] To Whom It May Concern: 
 
Alfonso Burch is currently under the care of [address] [place] [address]. She has the following restrictions: 
Full work restrictions for injuries to the Thigh, Knee, Leg, Ankle, and Foot PATIENT'S NAME: Gris Kiser   DATE: 8/31/2020 LIFTING:   The patient can lift no more than 20 pounds. CLIMBING:   The patient can climb no ladders or stairs WALKING/STANDING The patient can walk or stand no more than 1 hour at a time. The patient cannot walk on uneven ground or on scaffolding. KNEELING/SQUATTING The patient cannot kneel or squat These restrictions are in effect for the above named patient From: 8/31/2020  TO: ezequiel Polo If there are questions or concerns please have the patient contact our office.  
 
 
 
Sincerely, 
 
 
Juan Anthony MD
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
## 2020-08-31 NOTE — PROGRESS NOTES
Verbal order given by Dr. Shy Diaz to draw up 4 mL 0.25% Sensorcaine and 0.5 mL 30 mg/5mL Betamethasone.

## 2021-07-23 ENCOUNTER — OFFICE VISIT (OUTPATIENT)
Dept: ORTHOPEDIC SURGERY | Age: 61
End: 2021-07-23
Payer: COMMERCIAL

## 2021-07-23 VITALS
BODY MASS INDEX: 36.88 KG/M2 | TEMPERATURE: 96.8 F | HEART RATE: 88 BPM | OXYGEN SATURATION: 100 % | HEIGHT: 64 IN | WEIGHT: 216 LBS

## 2021-07-23 DIAGNOSIS — M17.11 PRIMARY OSTEOARTHRITIS OF RIGHT KNEE: ICD-10-CM

## 2021-07-23 DIAGNOSIS — G89.29 CHRONIC PAIN OF LEFT KNEE: ICD-10-CM

## 2021-07-23 DIAGNOSIS — G89.29 CHRONIC PAIN OF RIGHT KNEE: ICD-10-CM

## 2021-07-23 DIAGNOSIS — M25.562 CHRONIC PAIN OF LEFT KNEE: ICD-10-CM

## 2021-07-23 DIAGNOSIS — M25.561 CHRONIC PAIN OF RIGHT KNEE: ICD-10-CM

## 2021-07-23 DIAGNOSIS — M17.12 PRIMARY OSTEOARTHRITIS OF LEFT KNEE: Primary | ICD-10-CM

## 2021-07-23 PROCEDURE — 99213 OFFICE O/P EST LOW 20 MIN: CPT | Performed by: SPECIALIST

## 2021-07-23 PROCEDURE — 20610 DRAIN/INJ JOINT/BURSA W/O US: CPT | Performed by: SPECIALIST

## 2021-07-23 RX ORDER — BETAMETHASONE SODIUM PHOSPHATE AND BETAMETHASONE ACETATE 3; 3 MG/ML; MG/ML
6 INJECTION, SUSPENSION INTRA-ARTICULAR; INTRALESIONAL; INTRAMUSCULAR; SOFT TISSUE ONCE
Status: COMPLETED | OUTPATIENT
Start: 2021-07-23 | End: 2021-07-23

## 2021-07-23 RX ADMIN — BETAMETHASONE SODIUM PHOSPHATE AND BETAMETHASONE ACETATE 6 MG: 3; 3 INJECTION, SUSPENSION INTRA-ARTICULAR; INTRALESIONAL; INTRAMUSCULAR; SOFT TISSUE at 10:40

## 2021-07-23 NOTE — LETTER
7/23/2021 10:28 AM    [facility]  [address] [facility]         Full work restrictions for injuries to the Thigh, Knee, Leg      PATIENT'S NAME: Orlando Kiser   DATE: 7/23/2021     LIFTING:   The patient can lift no more than 20 pounds. CLIMBING:   The patient can climb no ladders or stairs    WALKING/STANDING The patient can walk or stand no more than 1 hour at a time. The patient cannot walk on uneven ground or on scaffolding.     KNEELING/SQUATTING The patient cannot kneel or squat    These restrictions are in effect for the above named patient  From: 8-  TO:PERMANENT        Sincerely,        Evelyn Rubi MD
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
## 2021-08-02 ENCOUNTER — TELEPHONE (OUTPATIENT)
Dept: ORTHOPEDIC SURGERY | Age: 61
End: 2021-08-02

## 2021-08-02 DIAGNOSIS — M17.11 PRIMARY OSTEOARTHRITIS OF RIGHT KNEE: ICD-10-CM

## 2021-08-02 DIAGNOSIS — G89.29 CHRONIC PAIN OF LEFT KNEE: ICD-10-CM

## 2021-08-02 DIAGNOSIS — M25.562 CHRONIC PAIN OF LEFT KNEE: ICD-10-CM

## 2021-08-02 DIAGNOSIS — M17.12 PRIMARY OSTEOARTHRITIS OF LEFT KNEE: Primary | ICD-10-CM

## 2021-08-02 DIAGNOSIS — M25.561 CHRONIC PAIN OF RIGHT KNEE: ICD-10-CM

## 2021-08-02 DIAGNOSIS — G89.29 CHRONIC PAIN OF RIGHT KNEE: ICD-10-CM

## 2021-08-04 ENCOUNTER — DOCUMENTATION ONLY (OUTPATIENT)
Dept: ORTHOPEDIC SURGERY | Age: 61
End: 2021-08-04

## 2021-08-09 DIAGNOSIS — G89.29 CHRONIC PAIN OF LEFT KNEE: ICD-10-CM

## 2021-08-09 DIAGNOSIS — M17.11 PRIMARY OSTEOARTHRITIS OF RIGHT KNEE: ICD-10-CM

## 2021-08-09 DIAGNOSIS — M17.12 PRIMARY OSTEOARTHRITIS OF LEFT KNEE: ICD-10-CM

## 2021-08-09 DIAGNOSIS — M25.562 CHRONIC PAIN OF LEFT KNEE: ICD-10-CM

## 2021-08-09 DIAGNOSIS — M25.561 CHRONIC PAIN OF RIGHT KNEE: ICD-10-CM

## 2021-08-09 DIAGNOSIS — G89.29 CHRONIC PAIN OF RIGHT KNEE: ICD-10-CM

## 2021-08-23 ENCOUNTER — OFFICE VISIT (OUTPATIENT)
Dept: ORTHOPEDIC SURGERY | Age: 61
End: 2021-08-23
Payer: COMMERCIAL

## 2021-08-23 VITALS
TEMPERATURE: 96.8 F | HEART RATE: 80 BPM | HEIGHT: 64 IN | WEIGHT: 219 LBS | OXYGEN SATURATION: 100 % | BODY MASS INDEX: 37.39 KG/M2

## 2021-08-23 DIAGNOSIS — M17.11 PRIMARY OSTEOARTHRITIS OF RIGHT KNEE: ICD-10-CM

## 2021-08-23 DIAGNOSIS — M75.52 CHRONIC BURSITIS OF LEFT SHOULDER: ICD-10-CM

## 2021-08-23 DIAGNOSIS — M17.12 PRIMARY OSTEOARTHRITIS OF LEFT KNEE: Primary | ICD-10-CM

## 2021-08-23 DIAGNOSIS — G89.29 CHRONIC PAIN OF LEFT KNEE: ICD-10-CM

## 2021-08-23 DIAGNOSIS — G89.29 CHRONIC LEFT SHOULDER PAIN: ICD-10-CM

## 2021-08-23 DIAGNOSIS — M25.512 CHRONIC LEFT SHOULDER PAIN: ICD-10-CM

## 2021-08-23 DIAGNOSIS — G89.29 CHRONIC PAIN OF RIGHT KNEE: ICD-10-CM

## 2021-08-23 DIAGNOSIS — M25.561 CHRONIC PAIN OF RIGHT KNEE: ICD-10-CM

## 2021-08-23 DIAGNOSIS — S43.005S SHOULDER DISLOCATION, LEFT, SEQUELA: ICD-10-CM

## 2021-08-23 DIAGNOSIS — M25.562 CHRONIC PAIN OF LEFT KNEE: ICD-10-CM

## 2021-08-23 PROCEDURE — 20610 DRAIN/INJ JOINT/BURSA W/O US: CPT | Performed by: SPECIALIST

## 2021-08-23 PROCEDURE — 99213 OFFICE O/P EST LOW 20 MIN: CPT | Performed by: SPECIALIST

## 2021-08-23 RX ORDER — HYALURONATE SOD, CROSS-LINKED 30 MG/3 ML
25 SYRINGE (ML) INTRAARTICULAR ONCE
Qty: 3 ML | Refills: 0 | Status: SHIPPED | OUTPATIENT
Start: 2021-08-23 | End: 2021-08-23 | Stop reason: SDUPTHER

## 2021-08-23 RX ORDER — HYALURONATE SOD, CROSS-LINKED 30 MG/3 ML
25 SYRINGE (ML) INTRAARTICULAR ONCE
Qty: 3 ML | Refills: 0
Start: 2021-08-23 | End: 2021-08-23
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
## 2021-09-03 ENCOUNTER — OFFICE VISIT (OUTPATIENT)
Dept: ORTHOPEDIC SURGERY | Age: 61
End: 2021-09-03
Payer: COMMERCIAL

## 2021-09-03 VITALS
BODY MASS INDEX: 37.05 KG/M2 | RESPIRATION RATE: 16 BRPM | WEIGHT: 217 LBS | HEIGHT: 64 IN | HEART RATE: 92 BPM | OXYGEN SATURATION: 100 % | TEMPERATURE: 96.9 F

## 2021-09-03 DIAGNOSIS — G89.29 CHRONIC PAIN OF LEFT KNEE: ICD-10-CM

## 2021-09-03 DIAGNOSIS — M25.561 CHRONIC PAIN OF RIGHT KNEE: ICD-10-CM

## 2021-09-03 DIAGNOSIS — M17.12 PRIMARY OSTEOARTHRITIS OF LEFT KNEE: Primary | ICD-10-CM

## 2021-09-03 DIAGNOSIS — G89.29 CHRONIC PAIN OF RIGHT KNEE: ICD-10-CM

## 2021-09-03 DIAGNOSIS — M25.562 CHRONIC PAIN OF LEFT KNEE: ICD-10-CM

## 2021-09-03 DIAGNOSIS — M17.11 PRIMARY OSTEOARTHRITIS OF RIGHT KNEE: ICD-10-CM

## 2021-09-03 PROCEDURE — 20610 DRAIN/INJ JOINT/BURSA W/O US: CPT | Performed by: SPECIALIST

## 2021-09-03 RX ORDER — HYALURONATE SOD, CROSS-LINKED 30 MG/3 ML
25 SYRINGE (ML) INTRAARTICULAR ONCE
Qty: 3 ML | Refills: 0
Start: 2021-09-03 | End: 2021-09-03

## 2021-09-03 NOTE — PROGRESS NOTES
Patient: Apollo Lundy                MRN: [id number]       SSN: [id number]  YOB: 1960        AGE: 64 y.o. SEX: female  Body mass index is 37.25 kg/m². PCP: Kiko Poon Not On File, MD  09/03/21    Chief Complaint   Patient presents with    Knee Pain     bilateral     HISTORY:  Apollo Lundy is a 64 y.o. female who is seen for bilateral knee pain. ICD-10-CM ICD-9-CM    1. Primary osteoarthritis of left knee  M17.12 715.16 sodium hyaluronate (Visco-3) 10 mg/mL syrg injection      [place] DRAIN/INJECT LARGE JOINT/BURSA   2. Chronic pain of left knee  M25.562 719.46 sodium hyaluronate (Visco-3) 10 mg/mL syrg injection    G89.29 338.29 [place] DRAIN/INJECT LARGE JOINT/BURSA   3. Primary osteoarthritis of right knee  M17.11 715.16 sodium hyaluronate (Visco-3) 10 mg/mL syrg injection      [place] DRAIN/INJECT LARGE JOINT/BURSA   4. Chronic pain of right knee  M25.561 719.46 sodium hyaluronate (Visco-3) 10 mg/mL syrg injection    G89.29 338.29 [place] DRAIN/INJECT LARGE JOINT/BURSA       Chart reviewed for the following:   I, Derrick Wolfe MD, have reviewed the History, Physical and updated the Allergic reactions for [address] [place] [address] Street performed immediately prior to start of procedure:  Loyda Hernández MD, have performed the following reviews on Apollo Lundy prior to the start of the procedure:            * Patient was identified by name and date of birth   * Agreement on procedure being performed was verified  * Risks and Benefits explained to the patient  * Procedure site verified and marked as necessary  * Patient was positioned for comfort  * Consent was obtained     Time: 10:36 AM     Date of procedure: 9/3/2021    Procedure performed by:  Derrick Wolfe MD    Ms. Kiser tolerated the procedure well with no complications. PLAN:  After discussing treatment options, patient's knees were injected with 2 cc of Visco-3.   Ms. Pedro Bradshaw will follow up in one week to complete her visco supplementation injection series.       Scribed by Latricia Alcala ([facility]) as dictated by Sandhya Alfaro MD

## 2021-09-10 ENCOUNTER — OFFICE VISIT (OUTPATIENT)
Dept: ORTHOPEDIC SURGERY | Age: 61
End: 2021-09-10
Payer: COMMERCIAL

## 2021-09-10 VITALS
HEART RATE: 75 BPM | HEIGHT: 64 IN | OXYGEN SATURATION: 100 % | BODY MASS INDEX: 37.22 KG/M2 | TEMPERATURE: 96.6 F | WEIGHT: 218 LBS | RESPIRATION RATE: 16 BRPM

## 2021-09-10 DIAGNOSIS — G89.29 CHRONIC PAIN OF RIGHT KNEE: ICD-10-CM

## 2021-09-10 DIAGNOSIS — M25.561 CHRONIC PAIN OF RIGHT KNEE: ICD-10-CM

## 2021-09-10 DIAGNOSIS — G89.29 CHRONIC PAIN OF LEFT KNEE: ICD-10-CM

## 2021-09-10 DIAGNOSIS — M17.11 PRIMARY OSTEOARTHRITIS OF RIGHT KNEE: ICD-10-CM

## 2021-09-10 DIAGNOSIS — M25.562 CHRONIC PAIN OF LEFT KNEE: ICD-10-CM

## 2021-09-10 DIAGNOSIS — M17.12 PRIMARY OSTEOARTHRITIS OF LEFT KNEE: Primary | ICD-10-CM

## 2021-09-10 PROCEDURE — 20610 DRAIN/INJ JOINT/BURSA W/O US: CPT | Performed by: SPECIALIST

## 2021-09-10 RX ORDER — HYALURONATE SOD, CROSS-LINKED 30 MG/3 ML
25 SYRINGE (ML) INTRAARTICULAR ONCE
Qty: 3 ML | Refills: 0
Start: 2021-09-10 | End: 2021-09-10

## 2021-09-10 NOTE — PROGRESS NOTES
Patient: Urszula Rojas                MRN: [id number]       SSN: [id number]  YOB: 1960        AGE: 64 y.o. SEX: female  Body mass index is 37.42 kg/m². PCP: Heather Perez Not On File, MD  09/10/21    Chief Complaint   Patient presents with    Knee Pain     bilateral      HISTORY:  Urszula Rojas is a 64 y.o. female who is seen for bilateral knee pain. TIME OUT performed immediately prior to start of procedure:  Katerina Felder MD, have performed the following reviews on Urszula Rojas prior to the start of the procedure:            * Patient was identified by name and date of birth   * Agreement on procedure being performed was verified  * Risks and Benefits explained to the patient  * Procedure site verified and marked as necessary  * Patient was positioned for comfort  * Consent was obtained     Time: 9:43 AM     Date of procedure: 9/10/2021    Procedure performed by:  Helga Mae MD    Ms. Kiser tolerated the procedure well with no complications      [id number] ICD-9-CM    1. Primary osteoarthritis of left knee  M17.12 715.16 sodium hyaluronate (Visco-3) 10 mg/mL syrg injection      GA VISCO-3 INJ DOSE   2. Chronic pain of left knee  M25.562 719.46 sodium hyaluronate (Visco-3) 10 mg/mL syrg injection    G89.29 338.29 GA VISCO-3 INJ DOSE   3. Primary osteoarthritis of right knee  M17.11 715.16 sodium hyaluronate (Visco-3) 10 mg/mL syrg injection      GA VISCO-3 INJ DOSE   4. Chronic pain of right knee  M25.561 719.46 sodium hyaluronate (Visco-3) 10 mg/mL syrg injection    G89.29 338.29 GA VISCO-3 INJ DOSE     PLAN:  After discussing treatment options, patient's knees were injected with 2 cc of Visco-3. Ms. [employer] will follow up PRN now that she has completed her visco supplementation injection series.       Scribed by Rosy Craig ([address]) as dictated by Helga Mae MD

## 2022-01-14 ENCOUNTER — DOCUMENTATION ONLY (OUTPATIENT)
Dept: ORTHOPEDIC SURGERY | Age: 62
End: 2022-01-14

## 2022-01-14 NOTE — PROGRESS NOTES
[employer], reasonable accomodations auth form received and placed in forms bin at [facility] 01-14-22

## 2022-01-25 ENCOUNTER — DOCUMENTATION ONLY (OUTPATIENT)
Dept: ORTHOPEDIC SURGERY | Age: 62
End: 2022-01-25

## 2022-02-25 ENCOUNTER — TRANSCRIBE ORDER (OUTPATIENT)
Dept: SCHEDULING | Age: 62
End: 2022-02-25

## 2022-02-25 DIAGNOSIS — Z12.39 BREAST CANCER SCREENING: Primary | ICD-10-CM

## 2022-03-18 PROBLEM — Z91.09 ENVIRONMENTAL ALLERGIES: Status: ACTIVE | Noted: 2017-09-21

## 2022-03-19 PROBLEM — E66.01 OBESITY, MORBID (HCC): Status: ACTIVE | Noted: 2019-10-10

## 2022-03-19 PROBLEM — J45.20 MILD INTERMITTENT ASTHMA WITHOUT COMPLICATION: Status: ACTIVE | Noted: 2017-09-21

## 2022-03-19 PROBLEM — E03.9 ACQUIRED HYPOTHYROIDISM: Status: ACTIVE | Noted: 2017-09-21

## 2022-03-19 PROBLEM — I10 ESSENTIAL HYPERTENSION: Status: ACTIVE | Noted: 2017-09-21

## 2022-11-15 ENCOUNTER — TRANSCRIBE ORDER (OUTPATIENT)
Dept: SCHEDULING | Age: 62
End: 2022-11-15

## 2022-11-15 DIAGNOSIS — F17.200 TOBACCO USE DISORDER: Primary | ICD-10-CM

## 2022-11-30 ENCOUNTER — TRANSCRIBE ORDER (OUTPATIENT)
Dept: SCHEDULING | Age: 62
End: 2022-11-30

## 2022-11-30 DIAGNOSIS — F17.200 SMOKER: Primary | ICD-10-CM

## 2022-12-08 ENCOUNTER — OFFICE VISIT (OUTPATIENT)
Dept: ORTHOPEDIC SURGERY | Age: 62
End: 2022-12-08
Payer: COMMERCIAL

## 2022-12-08 DIAGNOSIS — Z91.81 AT RISK FOR FALLS: ICD-10-CM

## 2022-12-08 DIAGNOSIS — M25.562 CHRONIC PAIN OF LEFT KNEE: ICD-10-CM

## 2022-12-08 DIAGNOSIS — M75.52 CHRONIC BURSITIS OF LEFT SHOULDER: ICD-10-CM

## 2022-12-08 DIAGNOSIS — M17.12 PRIMARY OSTEOARTHRITIS OF LEFT KNEE: ICD-10-CM

## 2022-12-08 DIAGNOSIS — G89.29 CHRONIC PAIN OF RIGHT KNEE: ICD-10-CM

## 2022-12-08 DIAGNOSIS — S43.005S SHOULDER DISLOCATION, LEFT, SEQUELA: ICD-10-CM

## 2022-12-08 DIAGNOSIS — M25.561 CHRONIC PAIN OF RIGHT KNEE: ICD-10-CM

## 2022-12-08 DIAGNOSIS — G89.29 CHRONIC PAIN OF LEFT KNEE: ICD-10-CM

## 2022-12-08 DIAGNOSIS — G89.29 CHRONIC LEFT SHOULDER PAIN: ICD-10-CM

## 2022-12-08 DIAGNOSIS — M25.512 CHRONIC LEFT SHOULDER PAIN: ICD-10-CM

## 2022-12-08 DIAGNOSIS — M17.11 PRIMARY OSTEOARTHRITIS OF RIGHT KNEE: Primary | ICD-10-CM

## 2022-12-08 PROCEDURE — 99213 OFFICE O/P EST LOW 20 MIN: CPT | Performed by: SPECIALIST
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
## 2023-01-26 ENCOUNTER — OFFICE VISIT (OUTPATIENT)
Dept: ORTHOPEDIC SURGERY | Age: 63
End: 2023-01-26
Payer: COMMERCIAL

## 2023-01-26 VITALS — BODY MASS INDEX: 34.23 KG/M2 | WEIGHT: 199.4 LBS | TEMPERATURE: 97 F

## 2023-01-26 DIAGNOSIS — M17.12 PRIMARY OSTEOARTHRITIS OF LEFT KNEE: Primary | ICD-10-CM

## 2023-01-26 DIAGNOSIS — G89.29 CHRONIC PAIN OF RIGHT KNEE: ICD-10-CM

## 2023-01-26 DIAGNOSIS — G89.29 CHRONIC PAIN OF LEFT KNEE: ICD-10-CM

## 2023-01-26 DIAGNOSIS — M25.561 CHRONIC PAIN OF RIGHT KNEE: ICD-10-CM

## 2023-01-26 DIAGNOSIS — M17.11 PRIMARY OSTEOARTHRITIS OF RIGHT KNEE: ICD-10-CM

## 2023-01-26 DIAGNOSIS — M25.562 CHRONIC PAIN OF LEFT KNEE: ICD-10-CM

## 2023-01-26 RX ORDER — BETAMETHASONE SODIUM PHOSPHATE AND BETAMETHASONE ACETATE 3; 3 MG/ML; MG/ML
6 INJECTION, SUSPENSION INTRA-ARTICULAR; INTRALESIONAL; INTRAMUSCULAR; SOFT TISSUE ONCE
Status: COMPLETED | OUTPATIENT
Start: 2023-01-26 | End: 2023-01-26

## 2023-01-26 RX ADMIN — BETAMETHASONE SODIUM PHOSPHATE AND BETAMETHASONE ACETATE 6 MG: 3; 3 INJECTION, SUSPENSION INTRA-ARTICULAR; INTRALESIONAL; INTRAMUSCULAR; SOFT TISSUE at 11:32
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
## 2024-05-02 ENCOUNTER — OFFICE VISIT (OUTPATIENT)
Age: 64
End: 2024-05-02
Payer: COMMERCIAL

## 2024-05-02 VITALS — TEMPERATURE: 97 F | BODY MASS INDEX: 30.19 KG/M2 | HEIGHT: 63 IN | WEIGHT: 170.4 LBS

## 2024-05-02 DIAGNOSIS — M17.12 UNILATERAL PRIMARY OSTEOARTHRITIS, LEFT KNEE: Primary | ICD-10-CM

## 2024-05-02 DIAGNOSIS — G89.29 CHRONIC PAIN OF RIGHT KNEE: ICD-10-CM

## 2024-05-02 DIAGNOSIS — M17.11 UNILATERAL PRIMARY OSTEOARTHRITIS, RIGHT KNEE: ICD-10-CM

## 2024-05-02 DIAGNOSIS — M25.561 CHRONIC PAIN OF RIGHT KNEE: ICD-10-CM

## 2024-05-02 DIAGNOSIS — M25.562 CHRONIC PAIN OF LEFT KNEE: ICD-10-CM

## 2024-05-02 DIAGNOSIS — G89.29 CHRONIC PAIN OF LEFT KNEE: ICD-10-CM

## 2024-05-02 PROCEDURE — 20610 DRAIN/INJ JOINT/BURSA W/O US: CPT | Performed by: SPECIALIST

## 2024-05-02 PROCEDURE — 99213 OFFICE O/P EST LOW 20 MIN: CPT | Performed by: SPECIALIST

## 2024-05-02 RX ORDER — BUPIVACAINE HYDROCHLORIDE 5 MG/ML
4 INJECTION, SOLUTION PERINEURAL ONCE
Status: COMPLETED | OUTPATIENT
Start: 2024-05-02 | End: 2024-05-02

## 2024-05-02 RX ORDER — BETAMETHASONE SODIUM PHOSPHATE AND BETAMETHASONE ACETATE 3; 3 MG/ML; MG/ML
3 INJECTION, SUSPENSION INTRA-ARTICULAR; INTRALESIONAL; INTRAMUSCULAR; SOFT TISSUE ONCE
Status: COMPLETED | OUTPATIENT
Start: 2024-05-02 | End: 2024-05-02

## 2024-05-02 RX ADMIN — BUPIVACAINE HYDROCHLORIDE 20 MG: 5 INJECTION, SOLUTION PERINEURAL at 09:40

## 2024-05-02 RX ADMIN — BETAMETHASONE SODIUM PHOSPHATE AND BETAMETHASONE ACETATE 3 MG: 3; 3 INJECTION, SUSPENSION INTRA-ARTICULAR; INTRALESIONAL; INTRAMUSCULAR; SOFT TISSUE at 09:40

## 2024-05-02 RX ADMIN — BETAMETHASONE SODIUM PHOSPHATE AND BETAMETHASONE ACETATE 3 MG: 3; 3 INJECTION, SUSPENSION INTRA-ARTICULAR; INTRALESIONAL; INTRAMUSCULAR; SOFT TISSUE at 09:41

## 2024-05-02 RX ADMIN — BUPIVACAINE HYDROCHLORIDE 20 MG: 5 INJECTION, SOLUTION PERINEURAL at 09:39

## 2025-05-05 NOTE — PROGRESS NOTES
Chief Complaint   Patient presents with    Knee Pain     Abelino     0/10 pain. Patient on bactrim, therapy appropriate